# Patient Record
Sex: MALE | Race: WHITE | Employment: OTHER | URBAN - METROPOLITAN AREA
[De-identification: names, ages, dates, MRNs, and addresses within clinical notes are randomized per-mention and may not be internally consistent; named-entity substitution may affect disease eponyms.]

---

## 2018-05-09 PROCEDURE — 81001 URINALYSIS AUTO W/SCOPE: CPT | Performed by: INTERNAL MEDICINE

## 2020-02-18 PROCEDURE — 88305 TISSUE EXAM BY PATHOLOGIST: CPT | Performed by: SPECIALIST

## 2020-10-29 PROBLEM — M75.122 NONTRAUMATIC COMPLETE TEAR OF LEFT ROTATOR CUFF: Status: ACTIVE | Noted: 2020-10-29

## 2021-01-15 PROBLEM — Z47.89 ORTHOPEDIC AFTERCARE: Status: ACTIVE | Noted: 2021-01-15

## 2022-01-25 PROBLEM — Z47.89 ORTHOPEDIC AFTERCARE: Status: RESOLVED | Noted: 2021-01-15 | Resolved: 2022-01-25

## 2022-01-25 PROBLEM — M25.50 ARTHRALGIA, UNSPECIFIED JOINT: Status: ACTIVE | Noted: 2022-01-25

## 2022-01-25 PROBLEM — M75.122 NONTRAUMATIC COMPLETE TEAR OF LEFT ROTATOR CUFF: Status: RESOLVED | Noted: 2020-10-29 | Resolved: 2022-01-25

## 2025-06-18 RX ORDER — CEPHALEXIN 500 MG/1
500 CAPSULE ORAL 4 TIMES DAILY
COMMUNITY
Start: 2025-06-17 | End: 2025-06-21

## 2025-06-19 ENCOUNTER — HOSPITAL ENCOUNTER (OUTPATIENT)
Facility: HOSPITAL | Age: 75
Discharge: HOME | End: 2025-06-21
Attending: ORTHOPAEDIC SURGERY | Admitting: ORTHOPAEDIC SURGERY
Payer: MEDICARE

## 2025-06-19 ENCOUNTER — ANESTHESIA (OUTPATIENT)
Dept: SURGERY | Facility: HOSPITAL | Age: 75
End: 2025-06-19
Payer: MEDICARE

## 2025-06-19 ENCOUNTER — ANESTHESIA EVENT (OUTPATIENT)
Dept: SURGERY | Facility: HOSPITAL | Age: 75
End: 2025-06-19
Payer: MEDICARE

## 2025-06-19 PROBLEM — M65.141 SUPPURATIVE TENOSYNOVITIS OF FLEXOR TENDON OF RIGHT HAND: Status: ACTIVE | Noted: 2025-06-19

## 2025-06-19 LAB
ALBUMIN SERPL-MCNC: 4.6 G/DL (ref 3.2–4.8)
ALBUMIN/GLOB SERPL: 1.6 {RATIO} (ref 1–2)
ALP LIVER SERPL-CCNC: 74 U/L (ref 45–117)
ALT SERPL-CCNC: 30 U/L (ref 10–49)
ANION GAP SERPL CALC-SCNC: 11 MMOL/L (ref 0–18)
AST SERPL-CCNC: 30 U/L (ref ?–34)
BILIRUB SERPL-MCNC: 0.4 MG/DL (ref 0.2–1.1)
BUN BLD-MCNC: 14 MG/DL (ref 9–23)
CALCIUM BLD-MCNC: 9.6 MG/DL (ref 8.7–10.6)
CHLORIDE SERPL-SCNC: 102 MMOL/L (ref 98–112)
CO2 SERPL-SCNC: 24 MMOL/L (ref 21–32)
CREAT BLD-MCNC: 1.09 MG/DL (ref 0.7–1.3)
EGFRCR SERPLBLD CKD-EPI 2021: 72 ML/MIN/1.73M2 (ref 60–?)
GLOBULIN PLAS-MCNC: 2.9 G/DL (ref 2–3.5)
GLUCOSE BLD-MCNC: 261 MG/DL (ref 70–99)
OSMOLALITY SERPL CALC.SUM OF ELEC: 294 MOSM/KG (ref 275–295)
POTASSIUM SERPL-SCNC: 3.8 MMOL/L (ref 3.5–5.1)
PROT SERPL-MCNC: 7.5 G/DL (ref 5.7–8.2)
SODIUM SERPL-SCNC: 137 MMOL/L (ref 136–145)

## 2025-06-19 PROCEDURE — 80053 COMPREHEN METABOLIC PANEL: CPT | Performed by: INTERNAL MEDICINE

## 2025-06-19 PROCEDURE — 87077 CULTURE AEROBIC IDENTIFY: CPT | Performed by: ORTHOPAEDIC SURGERY

## 2025-06-19 PROCEDURE — 87070 CULTURE OTHR SPECIMN AEROBIC: CPT | Performed by: ORTHOPAEDIC SURGERY

## 2025-06-19 PROCEDURE — 87186 SC STD MICRODIL/AGAR DIL: CPT | Performed by: ORTHOPAEDIC SURGERY

## 2025-06-19 PROCEDURE — 87205 SMEAR GRAM STAIN: CPT | Performed by: ORTHOPAEDIC SURGERY

## 2025-06-19 RX ORDER — HYDROCODONE BITARTRATE AND ACETAMINOPHEN 5; 325 MG/1; MG/1
1 TABLET ORAL ONCE AS NEEDED
Status: DISCONTINUED | OUTPATIENT
Start: 2025-06-19 | End: 2025-06-19 | Stop reason: HOSPADM

## 2025-06-19 RX ORDER — HYDROCODONE BITARTRATE AND ACETAMINOPHEN 5; 325 MG/1; MG/1
2 TABLET ORAL ONCE AS NEEDED
Status: DISCONTINUED | OUTPATIENT
Start: 2025-06-19 | End: 2025-06-19 | Stop reason: HOSPADM

## 2025-06-19 RX ORDER — ONDANSETRON 2 MG/ML
4 INJECTION INTRAMUSCULAR; INTRAVENOUS EVERY 6 HOURS PRN
Status: DISCONTINUED | OUTPATIENT
Start: 2025-06-19 | End: 2025-06-19 | Stop reason: HOSPADM

## 2025-06-19 RX ORDER — HYDROMORPHONE HYDROCHLORIDE 1 MG/ML
0.6 INJECTION, SOLUTION INTRAMUSCULAR; INTRAVENOUS; SUBCUTANEOUS EVERY 5 MIN PRN
Status: DISCONTINUED | OUTPATIENT
Start: 2025-06-19 | End: 2025-06-19 | Stop reason: HOSPADM

## 2025-06-19 RX ORDER — SODIUM PHOSPHATE, DIBASIC AND SODIUM PHOSPHATE, MONOBASIC 7; 19 G/230ML; G/230ML
1 ENEMA RECTAL ONCE AS NEEDED
Status: DISCONTINUED | OUTPATIENT
Start: 2025-06-19 | End: 2025-06-21

## 2025-06-19 RX ORDER — ONDANSETRON 2 MG/ML
INJECTION INTRAMUSCULAR; INTRAVENOUS AS NEEDED
Status: DISCONTINUED | OUTPATIENT
Start: 2025-06-19 | End: 2025-06-19 | Stop reason: SURG

## 2025-06-19 RX ORDER — SODIUM CHLORIDE, SODIUM LACTATE, POTASSIUM CHLORIDE, CALCIUM CHLORIDE 600; 310; 30; 20 MG/100ML; MG/100ML; MG/100ML; MG/100ML
INJECTION, SOLUTION INTRAVENOUS CONTINUOUS
Status: DISCONTINUED | OUTPATIENT
Start: 2025-06-19 | End: 2025-06-19 | Stop reason: HOSPADM

## 2025-06-19 RX ORDER — HYDROMORPHONE HYDROCHLORIDE 1 MG/ML
0.2 INJECTION, SOLUTION INTRAMUSCULAR; INTRAVENOUS; SUBCUTANEOUS EVERY 5 MIN PRN
Status: DISCONTINUED | OUTPATIENT
Start: 2025-06-19 | End: 2025-06-19 | Stop reason: HOSPADM

## 2025-06-19 RX ORDER — VANCOMYCIN HYDROCHLORIDE
15
Status: DISCONTINUED | OUTPATIENT
Start: 2025-06-19 | End: 2025-06-21

## 2025-06-19 RX ORDER — MORPHINE SULFATE 4 MG/ML
4 INJECTION, SOLUTION INTRAMUSCULAR; INTRAVENOUS EVERY 2 HOUR PRN
Status: DISCONTINUED | OUTPATIENT
Start: 2025-06-19 | End: 2025-06-21

## 2025-06-19 RX ORDER — SODIUM CHLORIDE, SODIUM LACTATE, POTASSIUM CHLORIDE, CALCIUM CHLORIDE 600; 310; 30; 20 MG/100ML; MG/100ML; MG/100ML; MG/100ML
INJECTION, SOLUTION INTRAVENOUS CONTINUOUS
Status: DISCONTINUED | OUTPATIENT
Start: 2025-06-19 | End: 2025-06-21

## 2025-06-19 RX ORDER — METOCLOPRAMIDE HYDROCHLORIDE 5 MG/ML
10 INJECTION INTRAMUSCULAR; INTRAVENOUS EVERY 8 HOURS PRN
Status: DISCONTINUED | OUTPATIENT
Start: 2025-06-19 | End: 2025-06-21

## 2025-06-19 RX ORDER — BISACODYL 10 MG
10 SUPPOSITORY, RECTAL RECTAL
Status: DISCONTINUED | OUTPATIENT
Start: 2025-06-19 | End: 2025-06-21

## 2025-06-19 RX ORDER — METOCLOPRAMIDE HYDROCHLORIDE 5 MG/ML
10 INJECTION INTRAMUSCULAR; INTRAVENOUS EVERY 8 HOURS PRN
Status: DISCONTINUED | OUTPATIENT
Start: 2025-06-19 | End: 2025-06-19 | Stop reason: HOSPADM

## 2025-06-19 RX ORDER — NALOXONE HYDROCHLORIDE 0.4 MG/ML
0.08 INJECTION, SOLUTION INTRAMUSCULAR; INTRAVENOUS; SUBCUTANEOUS AS NEEDED
Status: DISCONTINUED | OUTPATIENT
Start: 2025-06-19 | End: 2025-06-19 | Stop reason: HOSPADM

## 2025-06-19 RX ORDER — HYDROCODONE BITARTRATE AND ACETAMINOPHEN 5; 325 MG/1; MG/1
1-2 TABLET ORAL EVERY 6 HOURS PRN
Status: DISCONTINUED | OUTPATIENT
Start: 2025-06-19 | End: 2025-06-19 | Stop reason: SDUPTHER

## 2025-06-19 RX ORDER — MORPHINE SULFATE 2 MG/ML
2 INJECTION, SOLUTION INTRAMUSCULAR; INTRAVENOUS EVERY 2 HOUR PRN
Status: DISCONTINUED | OUTPATIENT
Start: 2025-06-19 | End: 2025-06-21

## 2025-06-19 RX ORDER — HYDROMORPHONE HYDROCHLORIDE 1 MG/ML
0.4 INJECTION, SOLUTION INTRAMUSCULAR; INTRAVENOUS; SUBCUTANEOUS EVERY 5 MIN PRN
Status: DISCONTINUED | OUTPATIENT
Start: 2025-06-19 | End: 2025-06-19 | Stop reason: HOSPADM

## 2025-06-19 RX ORDER — DEXAMETHASONE SODIUM PHOSPHATE 4 MG/ML
VIAL (ML) INJECTION AS NEEDED
Status: DISCONTINUED | OUTPATIENT
Start: 2025-06-19 | End: 2025-06-19 | Stop reason: SURG

## 2025-06-19 RX ORDER — HYDROCODONE BITARTRATE AND ACETAMINOPHEN 5; 325 MG/1; MG/1
1 TABLET ORAL EVERY 6 HOURS PRN
Refills: 0 | Status: DISCONTINUED | OUTPATIENT
Start: 2025-06-19 | End: 2025-06-21

## 2025-06-19 RX ORDER — POLYETHYLENE GLYCOL 3350 17 G/17G
17 POWDER, FOR SOLUTION ORAL DAILY PRN
Status: DISCONTINUED | OUTPATIENT
Start: 2025-06-19 | End: 2025-06-21

## 2025-06-19 RX ORDER — DOCUSATE SODIUM 100 MG/1
100 CAPSULE, LIQUID FILLED ORAL 2 TIMES DAILY
Status: DISCONTINUED | OUTPATIENT
Start: 2025-06-19 | End: 2025-06-21

## 2025-06-19 RX ORDER — MORPHINE SULFATE 2 MG/ML
1 INJECTION, SOLUTION INTRAMUSCULAR; INTRAVENOUS EVERY 2 HOUR PRN
Status: DISCONTINUED | OUTPATIENT
Start: 2025-06-19 | End: 2025-06-21

## 2025-06-19 RX ORDER — SENNOSIDES 8.6 MG
17.2 TABLET ORAL NIGHTLY
Status: DISCONTINUED | OUTPATIENT
Start: 2025-06-19 | End: 2025-06-21

## 2025-06-19 RX ORDER — ACETAMINOPHEN 500 MG
1000 TABLET ORAL ONCE
Status: DISCONTINUED | OUTPATIENT
Start: 2025-06-19 | End: 2025-06-19 | Stop reason: HOSPADM

## 2025-06-19 RX ORDER — HYDROCODONE BITARTRATE AND ACETAMINOPHEN 5; 325 MG/1; MG/1
2 TABLET ORAL EVERY 6 HOURS PRN
Refills: 0 | Status: DISCONTINUED | OUTPATIENT
Start: 2025-06-19 | End: 2025-06-21

## 2025-06-19 RX ORDER — ONDANSETRON 2 MG/ML
4 INJECTION INTRAMUSCULAR; INTRAVENOUS EVERY 6 HOURS PRN
Status: DISCONTINUED | OUTPATIENT
Start: 2025-06-19 | End: 2025-06-21

## 2025-06-19 RX ORDER — ACETAMINOPHEN 500 MG
1000 TABLET ORAL ONCE AS NEEDED
Status: DISCONTINUED | OUTPATIENT
Start: 2025-06-19 | End: 2025-06-19 | Stop reason: HOSPADM

## 2025-06-19 RX ORDER — MIDAZOLAM HYDROCHLORIDE 1 MG/ML
INJECTION INTRAMUSCULAR; INTRAVENOUS AS NEEDED
Status: DISCONTINUED | OUTPATIENT
Start: 2025-06-19 | End: 2025-06-19 | Stop reason: SURG

## 2025-06-19 RX ADMIN — ONDANSETRON 4 MG: 2 INJECTION INTRAMUSCULAR; INTRAVENOUS at 15:18:00

## 2025-06-19 RX ADMIN — MIDAZOLAM HYDROCHLORIDE 1 MG: 1 INJECTION INTRAMUSCULAR; INTRAVENOUS at 14:59:00

## 2025-06-19 RX ADMIN — SODIUM CHLORIDE, SODIUM LACTATE, POTASSIUM CHLORIDE, CALCIUM CHLORIDE: 600; 310; 30; 20 INJECTION, SOLUTION INTRAVENOUS at 15:50:00

## 2025-06-19 RX ADMIN — MIDAZOLAM HYDROCHLORIDE 1 MG: 1 INJECTION INTRAMUSCULAR; INTRAVENOUS at 15:19:00

## 2025-06-19 RX ADMIN — DEXAMETHASONE SODIUM PHOSPHATE 4 MG: 4 MG/ML VIAL (ML) INJECTION at 15:18:00

## 2025-06-19 NOTE — PLAN OF CARE
A&Ox4. VSS. On room air. . Acewrap to right hand C/D/I. IV abx, cx pending. Patient and spouse updated and in agreement with plan of care. Safety precautions in place. Instructed patient to call for assistance, call light within reach.

## 2025-06-19 NOTE — PROGRESS NOTES
[facility] Dosing Service      Initial Pharmacokinetic Consult for Vancomycin Dosing     Shahid Franks is a 75 year old male who is being initiated on vancomycin therapy for cellulitis.  Pharmacy has been asked to dose vancomycin by Dr. Banks.  The initial treatment and monitoring approach will be steady state AUC strategy.        Weight and Temperature:    Wt Readings from Last 1 Encounters:   25 95 kg (209 lb 7.2 oz)        Temp Readings from Last 1 Encounters:   25 97.6 °F (36.4 °C) (Oral)      Labs:   No results for input(s): \"CREATSERUM\" in the last 168 hours.   CrCl cannot be calculated (Patient's most recent lab result is older than the maximum 7 days allowed.).     No results for input(s): \"WBC\" in the last 168 hours.       The Pharmacokinetic Target is:     to 600 mg-h/L and trough <=15 mg/L    Renal Dosing Considerations:    None     Assessment/Plan:   Initial/Loading dose: Will receive 1500 mg IV (15 mg/kg, capped at 2250 mg) x 1 initial dose.      Maintenance dose: Pharmacy will dose vancomycin at 1500 mg IV every 18 hours    Monitorin) Plan for vancomycin peak and trough to be obtained at steady state    2) Pharmacy will order SCr as clinically indicated to assess renal function.    3) Pharmacy will monitor for toxicity and efficacy, adjust vancomycin dose and/or frequency, and order vancomycin levels as appropriate per the Pharmacy and Therapeutics Committee approved protocol until discontinuation of the medication.       We appreciate the opportunity to assist in the care of this patient.     Bruna Carbajal, Betzy  2025  5:21 PM  [facility] Extension: [phone]

## 2025-06-19 NOTE — H&P
Right Middle Finger - Pain Trigger finger   Left Ring Finger - Pain Trigger finger   Reason for Visit History      Progress Notes  Luzmaria Loving PA-C (Physician Assistant)  Physician Assistant  Expand All Collapse All  6/18/2025  Shahid Franks  2/13/1950  75 year old   male  Luzmaria Loving PA-C     HPI:      Mr Franks is here for a post operative visit having had surgery on 6- with Dr Gonzales.     He had right middle finger trigger release that appears infected & need surgery     He also had left ring finger trigger release        PMH:              Past Medical History:   Diagnosis Date    Hyperlipidemia      OSTEOARTHRITIS      OTHER DISEASES 2010     carotid dissection            Past Surgical History:   Procedure Laterality Date    COLONOSCOPY   10/01/2008    COLONOSCOPY N/A 02/18/2020     Procedure: COLONOSCOPY, POSSIBLE BIOPSY, POSSIBLE POLYPECTOMY 94358;  Surgeon: Yumiko Grimaldo MD;  Location: [facility], [facility]    OTHER SURGICAL HISTORY   2012     right rotator cuff repair  left 2021    SHOULDER ARTHROSCOPY Right 02/07/2025     acromioplasty, rotator cuff repair   Right rotator cuff February 7th     Medication:   Medications Ordered Prior to Encounter   cephalexin 500 MG Oral Cap, Take 1 capsule (500 mg total) by mouth 4 (four) times daily., Disp: 40 capsule, Rfl: 0  cephalexin 500 MG Oral Cap, Take 1 capsule PO BID for 7 days, Disp: 14 capsule, Rfl: 0  Ciclopirox 8 % External Solution, Apply 1 Application. topically nightly., Disp: 6 mL, Rfl: 11  PANTOPRAZOLE 40 MG Oral Tab EC, TAKE 1 TABLET BY MOUTH DAILY BEFORE MEAL, Disp: 90 tablet, Rfl: 0  Naproxen Sodium (ALEVE OR), Take by mouth as needed., Disp: , Rfl:   MULTIVITAMINS OR TABS, 1 TABLET DAILY, Disp: , Rfl:   GARLIC, qd, Disp: , Rfl:   GLUCOSAMINE CHONDRO COMPLEX OR, qd, Disp: , Rfl:   VITAMIN E, occassionally, Disp: , Rfl:   VITAMIN-B COMPLEX OR, occassionally, Disp: , Rfl:      No current facility-administered  medications on file prior to visit.      Allergies:  Allergies   No Known Allergies         EXAM:      Shahid Franks is awake and oriented x 3 and overall well appearing.    Shahid Franks is calm        S1 S2 no murmur click or rub  Lungs clear to auscultation     The incision is oozing slightly  Redness and swelling to the tip of the finger     He cannot move the finger much due to pain  Passive extension is painful        ASSESSMENT:      Trigger middle finger of right hand  (primary encounter diagnosis)  Infection in the flexor tendon sheath     PLAN:      1) surgical I& D indicated now        YOHANA Miles,PAJoseC

## 2025-06-19 NOTE — ANESTHESIA PROCEDURE NOTES
Airway  Date/Time: 6/19/2025 3:05 PM  Reason: elective      General Information and Staff   Patient location during procedure: OR  Anesthesiologist: Jemal Palmer MD  Performed: anesthesiologist   Performed by: Jemal Palmer MD  Authorized by: Jemal Palmer MD        Indications and Patient Condition  Indications for airway management: anesthesia  Sedation level: deep      Preoxygenated: yesPatient position: sniffing    Mask difficulty assessment: 0 - not attempted    Final Airway Details    Final airway type: supraglottic airway      Successful airway: classic  Size: 4     Number of attempts at approach: 1

## 2025-06-19 NOTE — CONSULTS
Infectious Disease Initial Consultation      Date of admission: 6/19/2025  1:43 PM     Date of service: 06/19/25 5:03 PM    Consult requested by: Sameer Torres MD    Reason for consult: Right middle finger tenosynovitis    Chief complaint: Right middle finger tenosynovitis    History of present illness: Shahid Franks is a 75 year old male with history of hyperlipidemia, presents here for right middle finger tenosynovitis, underwent or debridement on 6/19/2025.  Otherwise, he was hemodynamically stable, afebrile.  Cultures were sent and are currently pending.  The patient was given IV cefazolin preoperatively.    Risk factors/Exposures:  Living situation: At home with family  Sick contacts: None  Animals: No pets or other animal exposure  Travel: No recent local/international travel  /FCI/detention: None  Outdoor activities: Nothing unusual  Active TB exposure: None  IV drug use: None    Review of systems:  All other components of the review of systems are negative, except those described in the history of present illness.     Past Medical History[1]  Past Surgical History[2]  Short Social Hx on File[3]  Family History[4]  Reviewed, see above    Medications:  Current Hospital Medications[5]     Allergies:  Allergies[6]    Physical Exam:  Vitals:    06/19/25 1639   BP: 135/82   Pulse: 59   Resp: 17   Temp: 97.6 °F (36.4 °C)     Vitals signs and nursing note reviewed.   Constitutional:       Appearance: Normal appearance.   HENT:      Head: Normocephalic and atraumatic.      Mouth/Throat: Normal dentiti   Mouth: Mucous membranes are moist.   Neck:      Musculoskeletal: Neck supple.   Cardiovascular:      Rate and Rhythm: Normal rate.   Pulmonary:      Effort: Pulmonary effort is normal. No respiratory distress.   Skin:     General: Skin is warm and dry.   Neurological:      General: No focal deficit present.      Mental Status: Alert and oriented to person, place, and time.     Laboratory data:  I have  independently reviewed all lab results; including old microbiological results.        No results for input(s): \"RBC\", \"HGB\", \"HCT\", \"MCV\", \"MCH\", \"MCHC\", \"RDW\", \"NEPRELIM\", \"WBC\", \"PLT\", \"NEUT\", \"LYMPH\", \"MON\", \"EOS\", \"NRBC\" in the last 168 hours.    Microbiology data:  No results found for this visit on 06/19/25.    Impression:  Shahid Franks is a 75 year old male with     Right middle finger suppurative tenosynovitis  After recent trigger finger surgery  Status post OR debridement on 6/19/2025  Surgical cultures are pending  Currently on IV cefazolin    Recommendations:     Discontinue cefazolin and start ceftriaxone 2 g IV daily along with vancomycin, pharmacy to dose  Continue to follow-up on surgical cultures  Continue to monitor daily labs for antibiotic toxicity  Further recommendations will depend on the above work-up and clinical progress     The plan of care was discussed with the primary hospital team, Sameer Torres MD     Recommendations were also discussed with the patient; all questions were answered.     Thank you for this consultation. Please don't hesitate to call the ID team for questions or any acute changes in patient's clinical condition.    Please note that this report has been produced using speech recognition software and may contain errors related to that system including, but not limited to, errors in grammar, punctuation, and spelling, as well as words and phrases that possibly may have been recognized inappropriately.  If there are any questions or concerns, contact the dictating provider for clarification.    The 21st Century Cures Act makes medical notes like these available to patients in the interest of transparency. Please be advised this is a medical document. Medical documents are intended to carry relevant information, facts as evident, and the clinical opinion of the practitioner. The medical note is intended as peer to peer communication and may appear blunt or direct. It  is written in medical language and may contain abbreviations or verbiage that are unfamiliar.     Shawn Banks MD  [facility]. Tel: [phone]. Fax: [phone] Franks : 1950 MRN: [id number] CSN: [id number]          [1]   Past Medical History:   Hyperlipidemia    OSTEOARTHRITIS    OTHER DISEASES    carotid dissection    Visual impairment    glasses   [2]   Past Surgical History:  Procedure Laterality Date    Colonoscopy  10/08    Colonoscopy N/A 2020    Procedure: COLONOSCOPY, POSSIBLE BIOPSY, POSSIBLE POLYPECTOMY 20411;  Surgeon: Yumiko Grimaldo MD;  [facility] [facility]    Other surgical history      right rotator cuff repair   [3]   Social History  Socioeconomic History    Marital status:    Tobacco Use    Smoking status: Never    Smokeless tobacco: Never   Vaping Use    Vaping status: Never Used   Substance and Sexual Activity    Alcohol use: Yes     Alcohol/week: 12.0 standard drinks of alcohol     Types: 12 Standard drinks or equivalent per week     Comment: WINE    Drug use: No   Other Topics Concern    Seat Belt Yes     Social Drivers of Health     Food Insecurity: No Food Insecurity (2025)    NCSS - Food Insecurity     Worried About Running Out of Food in the Last Year: No     Ran Out of Food in the Last Year: No   Transportation Needs: No Transportation Needs (2025)    NCSS - Transportation     Lack of Transportation: No   Housing Stability: Not At Risk (2025)    NCSS - Housing/Utilities     Has Housing: Yes     Worried About Losing Housing: No     Unable to Get Utilities: No   [4]   Family History  Problem Relation Age of Onset    Cancer Father         PROSTATE    Other (Other) Father         dementia    Other (Other) Mother         CVA   [5]   lactated ringers    sodium chloride    sennosides    docusate sodium    polyethylene glycol (PEG 3350)    magnesium hydroxide    bisacodyl    fleet enema    ondansetron     metoclopramide    morphINE **OR** morphINE **OR** morphINE    ceFAZolin    HYDROcodone-acetaminophen **OR** HYDROcodone-acetaminophen  [6]   Allergies  Allergen Reactions    Statins MYALGIA

## 2025-06-19 NOTE — OPERATIVE REPORT
[facility]    PATIENT'S NAME: KYRA JIMENEZ   ATTENDING PHYSICIAN: Sameer Torres M.D.   OPERATING PHYSICIAN: Sameer Torres M.D.   PATIENT ACCOUNT#:   [id number]    LOCATION:  [address] [place] [address]  MEDICAL RECORD #:   [id number]       YOB: 1950  ADMISSION DATE:       06/19/2025      OPERATION DATE:  06/19/2025    OPERATIVE REPORT      PREOPERATIVE DIAGNOSIS:  Flexor tenosynovitis, right middle finger.  POSTOPERATIVE DIAGNOSIS:  Flexor tenosynovitis, right middle finger.  PROCEDURE:  Incision and drainage, flexor tendon sheath, right middle finger.    ANESTHESIA:  General.    OPERATIVE TECHNIQUE:  After induction of anesthesia, the arm was prepped and draped in sterile fashion.  The forearm and upper arm were exsanguinated and tourniquet elevated to 250 mmHg.  I first opened up the incision at the distal palmar crease from his previous trigger finger injection, and there were copious amounts of turbid fluid.  Cultures were sent for aerobic and anaerobic cultures.  Incision was made at the distal crease at the DIP joint, and a blunt needle was used to irrigate distal to proximal and then proximal to distal until fluid was returning clear.  The tourniquet was let down, and dressings were applied.  The patient was taken to the postanesthesia recovery room in stable condition.  No complications.  Specimens:  Aerobic and anaerobic cultures.    Dictated By Sameer Torres M.D.  d: 06/19/2025 16:07:13  t: 06/19/2025 17:01:37  [facility] [id number]  RLW/

## 2025-06-19 NOTE — BRIEF OP NOTE
Pre-Operative Diagnosis: TRIGGER MIDDLE FINGER HAND; INFECTION OF TENDON SHEATH     Post-Operative Diagnosis: TRIGGER MIDDLE FINGER HAND; INFECTION OF TENDON SHEATH      Procedure Performed:   RIGHT MIDDLE FINGER INCISION AND DRAINAGE    Surgeons and Role:     * Sameer Torres MD - Primary    Assistant(s):        Surgical Findings:        Specimen: CX for aerobic and anaerobic     Estimated Blood Loss: Blood Output: 5 mL (6/19/2025  3:35 PM)      Dictation Number:       Sameer Torres MD  6/19/2025  3:47 PM

## 2025-06-19 NOTE — CONSULTS
[facility] Hospitalist Consult Note     PCP: Huber Montes MD    History of Present Illness: Patient is a 75 year old male presented with right middle finger flexor tenosynovitis and underwent incision and drainage on 6/19. Seen postoperatively. Pain controlled. No n/v. Family at bedside.     Medical History:  Past Medical History[1]   Past Surgical History[2]   Social History     Tobacco Use    Smoking status: Never    Smokeless tobacco: Never   Substance Use Topics    Alcohol use: Yes     Alcohol/week: 12.0 standard drinks of alcohol     Types: 12 Standard drinks or equivalent per week     Comment: WINE      Family History[3]    Allergies[4]     Review of Systems  Comprehensive ROS reviewed and negative except for what is stated in HPI.      OBJECTIVE:  /82 (BP Location: Left arm)   Pulse 59   Temp 97.6 °F (36.4 °C) (Oral)   Resp 17   Ht 6' 2\" (1.88 m)   Wt 209 lb 7.2 oz (95 kg)   SpO2 95%   BMI 26.89 kg/m²   General: No apparent distress.  Alert and oriented.  HEENT:  EOMI, PERRLA.  Pulm: Clear breath sounds bilaterally.  Normal respiratory effort.  CV: Regular rate and rhythm, no murmur.   Abd: Soft, nontender, nondistended.   MSK: Right hand wrapped, no apparent drainage.   Skin: No lesions or rashes.  Neuro: No obvious focal deficits.    Data Review:    LABS:        All lab work and imaging personally reviewed.    Assessment/Plan:     Assessment/ Plan:     #Right middle finger tenosynovitis s/p incision/ drainage  -doing well, continue current pain regimen  -received periop ancef,  continue vancomycin/  rocephin. ID following.   -surgical cx pending  -monitor for s/sx of infection    DVT ppx: SCDs  Diet: advance as tolerated  Disposition: depending clinical course    D/w patient and family at bedside.     Outpatient records or previous hospital records reviewed.   [facility] hospitalist to continue to follow patient while in house.    DO Aylin Espino [facility] Hospitalist       [1]    Past Medical History:   Hyperlipidemia    OSTEOARTHRITIS    OTHER DISEASES    carotid dissection    Visual impairment    glasses   [2]   Past Surgical History:  Procedure Laterality Date    Colonoscopy  10/08    Colonoscopy N/A 2/18/2020    Procedure: COLONOSCOPY, POSSIBLE BIOPSY, POSSIBLE POLYPECTOMY 91705;  Surgeon: Yumiko Grimaldo MD;  [place] [facility]    Other surgical history      right rotator cuff repair   [3]   Family History  Problem Relation Age of Onset    Cancer Father         PROSTATE    Other (Other) Father         dementia    Other (Other) Mother         CVA   [4]   Allergies  Allergen Reactions    Statins MYALGIA

## 2025-06-19 NOTE — ANESTHESIA PREPROCEDURE EVALUATION
PRE-OP EVALUATION      Patient Name: Shahid Franks    Pre-op Diagnosis:  Procedure(s):  Finger ID      Surgeon(s) and Role:     Reji Torres  Pre-op vitals reviewed.  Patient Data/Vitals  Height: 188 cm (6' 2\")  Weight: 95 kg (209 lb 7.2 oz)  Weight (kg): 95.01 kg  BMI: 26.89  BP: (!) 167/92  Pulse: 57  Resp: 16  Temp: 98 °F (36.7 °C)    Current medications reviewed.  Hospital Medications:   [Transfer Hold] acetaminophen (Tylenol Extra Strength) tab 1,000 mg  1,000 mg Oral Once    lactated ringers infusion   Intravenous Continuous    ceFAZolin (Ancef) 2g in 10mL IV syringe premix  2 g Intravenous Once           Outpatient Medications:  No current outpatient medications on file.      Allergies: Statins        Anesthesia Evaluation    Patient summary reviewed.    Anesthetic Complications  (-) history of anesthetic complications         GI/Hepatic/Renal    Negative GI/hepatic/renal ROS.                             Cardiovascular        Exercise tolerance: good     MET: >4         (+) hyperlipidemia                    (-) angina     (-) MUNGUIA         Endo/Other                           (+) arthritis       Pulmonary    Negative pulmonary ROS.                       Neuro/Psych    Negative neuro/psych ROS.                                  Past Surgical History:   Procedure Laterality Date    Colonoscopy  10/08    Colonoscopy N/A 2/18/2020    Procedure: COLONOSCOPY, POSSIBLE BIOPSY, POSSIBLE POLYPECTOMY 08111;  Surgeon: Yumiko Grimaldo MD;  Location: [facility] [place] [facility]    Other surgical history      right rotator cuff repair     Social History     Tobacco Use    Smoking status: Never    Smokeless tobacco: Never   Substance Use Topics    Alcohol use: Yes     Alcohol/week: 12.0 standard drinks of alcohol     Types: 12 Standard drinks or equivalent per week     Comment: WINE     History   Drug Use No       Available pre-op labs reviewed.                                                        Airway      Mallampati: II  Mouth  opening: >3 FB  TM distance: 4 - 6 cm  Neck ROM: full Cardiovascular    Cardiovascular exam normal.         Dental             Pulmonary    Pulmonary exam normal.                 Other findings              ASA: 2   Plan: general  NPO status verified and patient meets guidelines.        Comment: General requested per surgeon  Plan/risks discussed with: patient                  ASA: 2   Plan: general  NPO status verified and patient meets guidelines.        Comment: General requested per surgeon  Plan/risks discussed with: patient

## 2025-06-19 NOTE — PROGRESS NOTES
[facility] Pharmacy Dosing Service      Initial Pharmacokinetic Consult for Vancomycin Dosing     Shahid Franks is a 75 year old male who is being initiated on vancomycin therapy for cellulitis.  Pharmacy has been asked to dose vancomycin by Dr. Banks.  The initial treatment and monitoring approach will be steady state AUC strategy.        Weight and Temperature:    Wt Readings from Last 1 Encounters:   25 95 kg (209 lb 7.2 oz)        Temp Readings from Last 1 Encounters:   25 97.6 °F (36.4 °C) (Oral)      Labs:   No results for input(s): \"CREATSERUM\" in the last 168 hours.   CrCl cannot be calculated (Patient's most recent lab result is older than the maximum 7 days allowed.).     No results for input(s): \"WBC\" in the last 168 hours.       The Pharmacokinetic Target is:     to 600 mg-h/L and trough <=15 mg/L    Renal Dosing Considerations:    None     Assessment/Plan:   Initial/Loading dose: Will receive 1500 mg IV (15 mg/kg, capped at 2250 mg) x 1 initial dose.      Maintenance dose: Pharmacy will dose vancomycin at 1500 mg IV every 18 hours    Monitorin) Plan for vancomycin peak and trough to be obtained at steady state    2) Pharmacy will order SCr as clinically indicated to assess renal function.    3) Pharmacy will monitor for toxicity and efficacy, adjust vancomycin dose and/or frequency, and order vancomycin levels as appropriate per the Pharmacy and Therapeutics Committee approved protocol until discontinuation of the medication.       We appreciate the opportunity to assist in the care of this patient.     Bruna Carbajal, Betzy  2025  5:21 PM  [facility] Extension: [phone]

## 2025-06-19 NOTE — ANESTHESIA POSTPROCEDURE EVALUATION
[facility]    Shahid Franks Patient Status:  Outpatient in a Bed   Age/Gender 75 year old male MRN [id number]   Location [facility] POST ANESTHESIA CARE UNIT Attending Sameer Torres MD   Hosp Day # 0 PCP Huber Montes MD       Anesthesia Post-op Note    RIGHT MIDDLE FINGER INCISION AND DRAINAGE    Procedure Summary       Date: 06/19/25 Room / Location:  MAIN OR 12 / [facility] MAIN OR    Anesthesia Start: 1459 Anesthesia Stop: 1550    Procedure: RIGHT MIDDLE FINGER INCISION AND DRAINAGE (Right: Finger) Diagnosis: (TRIGGER MIDDLE FINGER HAND; INFECTION OF TENDON SHEATH)    Surgeons: Sameer Torres MD Anesthesiologist: Jemal Palmer MD    Anesthesia Type: general ASA Status: 2            Anesthesia Type: general    Vitals Value Taken Time    90 06/19/25 15:50   Temp 97 06/19/25 15:50   Pulse 68 06/19/25 15:50   Resp 18 06/19/25 15:50   SpO2 96 06/19/25 15:50           Patient Location: PACU    Anesthesia Type: general    Airway Patency: patent    Postop Pain Control: adequate    Mental Status: preanesthetic baseline    Nausea/Vomiting: none    Cardiopulmonary/Hydration status: stable euvolemic    Complications: no apparent anesthesia related complications    Postop vital signs: stable    Dental Exam: Unchanged from Preop    Patient to be discharged from PACU when criteria met.

## 2025-06-19 NOTE — INTERVAL H&P NOTE
Pre-op Diagnosis: TRIGGER MIDDLE FINGER HAND; INFECTION OF TENDON SHEATH    The above referenced H&P was reviewed by Sameer Torres MD on 6/19/2025, the patient was examined and no significant changes have occurred in the patient's condition since the H&P was performed.  I discussed with the patient and/or legal representative the potential benefits, risks and side effects of this procedure; the likelihood of the patient achieving goals; and potential problems that might occur during recuperation.  I discussed reasonable alternatives to the procedure, including risks, benefits and side effects related to the alternatives and risks related to not receiving this procedure.  We will proceed with procedure as planned.

## 2025-06-20 LAB
CREAT BLD-MCNC: 1.05 MG/DL (ref 0.7–1.3)
EGFRCR SERPLBLD CKD-EPI 2021: 75 ML/MIN/1.73M2 (ref 60–?)
HCT VFR BLD AUTO: 39.5 % (ref 39–53)
HGB BLD-MCNC: 14.7 G/DL (ref 13–17.5)

## 2025-06-20 PROCEDURE — 82565 ASSAY OF CREATININE: CPT | Performed by: INTERNAL MEDICINE

## 2025-06-20 PROCEDURE — 85018 HEMOGLOBIN: CPT | Performed by: ORTHOPAEDIC SURGERY

## 2025-06-20 PROCEDURE — 85014 HEMATOCRIT: CPT | Performed by: ORTHOPAEDIC SURGERY

## 2025-06-20 NOTE — PROGRESS NOTES
[facility]   part of [facility] Infectious Disease  Progress Note    Shahid Franks Patient Status:  Outpatient in a Bed    1950 MRN [id number]   Location [facility] 3SW-A Attending Sameer Torres MD   Hosp Day # 0 PCP Huber Montes MD     Subjective:  Patient seen and examined sitting up in bed. Feeling well today. No acute overnight events. Tolerating IV antibiotics. Pain controlled. Denies F/C. Denies n/v/d. Otherwise no new complaints.     Objective:  Blood pressure 154/62, pulse 66, temperature 98.2 °F (36.8 °C), temperature source Oral, resp. rate 18, height 6' 2\" (1.88 m), weight 209 lb 7.2 oz (95 kg), SpO2 95%.    Intake/Output:    Intake/Output Summary (Last 24 hours) at 2025 0712  Last data filed at 2025 1550  Gross per 24 hour   Intake 500 ml   Output 5 ml   Net 495 ml       Physical Exam:  General: Awake, alert, non-tox, NAD.  HEENT:  Oropharynx clear, trachea ML.  Heart: RRR S1S2 no murmurs.  Lungs: Essentially CTA b/l, no rhonchi, rales, wheezes.  Abdomen: Soft, NT/ND.  BS present.  No guarding or rebound.  Extremity: R hand wrapped with dressing c/d/i.  Neurological: No focal deficits.  Derm:  Warm and dry.    Lab Data Review:  Lab Results   Component Value Date    HGB 14.7 2025    HCT 39.5 2025    CREATSERUM 1.05 2025    BUN 14 2025     2025    K 3.8 2025     2025    CO2 24.0 2025     2025    CA 9.6 2025    ALB 4.6 2025    ALKPHO 74 2025    BILT 0.4 2025    TP 7.5 2025    AST 30 2025    ALT 30 2025        Cultures:  No results found for this visit on 25.    Radiology:  No results found.    Assessment and Plan:  1. R middle finger suppurative tenosynovitis  - After recent trigger finger surgery.  - S/p OR debridement on 25 -- cultures pending.  - IV Rocephin + vancomycin, ongoing.    2. Recs  - Continue IV Rocephin +  vancomycin, pharmacy to dose.  - F/u WBC and fever curve.  - F/u cultures.  - Supportive care as per the primary team.  - Discussed plan of care with nursing.  - Discussed plan of care with patient. All questions addressed and understanding verbalized.  - Further recommendations pending clinical course.     Discussed case with ID attending/collaborating physician, Dr. Shawn Banks, who is in agreement with the above plan of care    Please note that this report has been produced using speech recognition software and may contain errors related to that system including, but not limited to, errors in grammar, punctuation, and spelling, as well as words and phrases that possibly may have been recognized inappropriately.  If there are any questions or concerns, contact the dictating provider for clarification.     The 21st Century Cures Act makes medical notes like these available to patients in the interest of transparency. Please be advised this is a medical document. Medical documents are intended to carry relevant information, facts as evident, and the clinical opinion of the practitioner. The medical note is intended as peer to peer communication and may appear blunt or direct. It is written in medical language and may contain abbreviations or verbiage that are unfamiliar.     If you have any questions or concerns please call [facility] Infectious Disease at [phone].     KENIA Gray    6/20/2025  7:12 AM

## 2025-06-20 NOTE — PROGRESS NOTES
Postop day #1    Shahid states he is much more comfortable    /62 (BP Location: Left arm)   Pulse 66   Temp 98.2 °F (36.8 °C) (Oral)   Resp 18   Ht 6' 2\" (1.88 m)   Wt 209 lb 7.2 oz (95 kg)   SpO2 95%   BMI 26.89 kg/m²     On examination swelling and tenderness are much improved.  No active drainage.  No fluctuance.    Cultures still pending    Assessment and plan: Postop day #1 status post incision and drainage for flexor tenosynovitis of the right middle finger.  Symptoms improved.  Awaiting cultures to determine final disposition.

## 2025-06-20 NOTE — PROGRESS NOTES
[facility] Hospitalist Progress Note     Subjective:  No acute events. Denies new symptoms.     Review of Systems  Comprehensive ROS reviewed and negative except for what is stated in HPI.      OBJECTIVE:  BP (!) 156/91 (BP Location: Left arm)   Pulse 60   Temp 97.9 °F (36.6 °C) (Oral)   Resp 18   Ht 6' 2\" (1.88 m)   Wt 209 lb 7.2 oz (95 kg)   SpO2 94%   BMI 26.89 kg/m²   General: No apparent distress.  Alert and oriented.  HEENT:  EOMI, PERRLA.  Pulm: Clear breath sounds bilaterally.  Normal respiratory effort.  CV: Regular rate and rhythm, no murmur.   Abd: Soft, nontender, nondistended.   MSK: Right hand wrapped, no apparent drainage.   Skin: No lesions or rashes.  Neuro: No obvious focal deficits.      Data Review:    LABS:   Lab Results   Component Value Date    HGB 14.7 06/20/2025    HCT 39.5 06/20/2025    CREATSERUM 1.05 06/20/2025    BUN 14 06/19/2025     06/19/2025    K 3.8 06/19/2025     06/19/2025    CO2 24.0 06/19/2025     06/19/2025    CA 9.6 06/19/2025    ALB 4.6 06/19/2025    ALKPHO 74 06/19/2025    BILT 0.4 06/19/2025    TP 7.5 06/19/2025    AST 30 06/19/2025    ALT 30 06/19/2025       All lab work and imaging personally reviewed.    Assessment/Plan:     Assessment/ Plan:     #Right middle finger tenosynovitis s/p incision/ drainage  -continue current pain regimen  -received periop ancef,  continue vancomycin/  rocephin. ID following.   -surgical cx pending- will follow   -monitor for s/sx of infection     DVT ppx: SCDs  Diet: advance as tolerated  Disposition: depending clinical course     D/w patient and family at bedside.      Outpatient records or previous hospital records reviewed.   [facility] hospitalist to continue to follow patient while in house.     DO [facility] Hospitalist

## 2025-06-20 NOTE — PLAN OF CARE
A&Ox4. VSS on RA. /IS. SCDs, ankle pumps encouraged. Tolerating diet, last BM 6/19. Voiding freely. Pain mananged with current medications. Dressing to R hand C/D/I. Up ad krishna. IV abx, cx pending. Patient updated on POC, verbalized agreement. Safety precautions in place, pt instructed to call for assistance, call light within reach.

## 2025-06-20 NOTE — PLAN OF CARE
POD 1 Rt middle finger I&D, Pt is AAOX4, 2 weeks post-op from trigger finger release, Pt is AAOX4, VSS, room air, up Ad krishna, PO meds for pain, see MAR, IV ABX, possible discharge tomorrow on PO ABX, will CTM.

## 2025-06-21 VITALS
SYSTOLIC BLOOD PRESSURE: 161 MMHG | WEIGHT: 209.44 LBS | DIASTOLIC BLOOD PRESSURE: 91 MMHG | RESPIRATION RATE: 18 BRPM | HEIGHT: 74 IN | TEMPERATURE: 98 F | BODY MASS INDEX: 26.88 KG/M2 | HEART RATE: 64 BPM | OXYGEN SATURATION: 91 %

## 2025-06-21 LAB
ERYTHROCYTE [DISTWIDTH] IN BLOOD BY AUTOMATED COUNT: 13 %
HCT VFR BLD AUTO: 41.2 % (ref 39–53)
HGB BLD-MCNC: 14.1 G/DL (ref 13–17.5)
MCH RBC QN AUTO: 32.4 PG (ref 26–34)
MCHC RBC AUTO-ENTMCNC: 34.2 G/DL (ref 31–37)
MCV RBC AUTO: 94.7 FL (ref 80–100)
PLATELET # BLD AUTO: 248 10(3)UL (ref 150–450)
RBC # BLD AUTO: 4.35 X10(6)UL (ref 3.8–5.8)
WBC # BLD AUTO: 7.7 X10(3) UL (ref 4–11)

## 2025-06-21 PROCEDURE — 85027 COMPLETE CBC AUTOMATED: CPT | Performed by: STUDENT IN AN ORGANIZED HEALTH CARE EDUCATION/TRAINING PROGRAM

## 2025-06-21 RX ORDER — CEFADROXIL 500 MG/1
1 CAPSULE ORAL 2 TIMES DAILY
Qty: 84 CAPSULE | Refills: 0 | Status: SHIPPED | OUTPATIENT
Start: 2025-06-21 | End: 2025-07-12

## 2025-06-21 NOTE — PLAN OF CARE
NURSING DISCHARGE NOTE    Discharged Home via Ambulatory.  Accompanied by Spouse  Belongings Taken by patient/family.    AVS printed and discussed, IV removed, Rx e-scribed for ABX, reminded to  medicine at pharmacy. Pt ready to discharge home.

## 2025-06-21 NOTE — PLAN OF CARE
A/o x4. Ra/. Denies pain, n/t to R hand. Declined pain medication. Pt hand wrap with kerlix dressing cdi. CMS intact. IV abx IV SL. Cx pending poss dc 6/21 on po abx or when medically cleared. Sleep aide requested by pt for this evening see orders. Up independent. Voiding without difficulty. All needs met at this time. POC updated with pt and spouse at bedside. All safety measures in place. Call light within reach instructed pt to call for help or assistance.

## 2025-06-21 NOTE — PROGRESS NOTES
S: I feel better- I just took this guaze off.    O: Blood pressure 160/84, pulse 59, temperature 98 °F (36.7 °C), temperature source Oral, resp. rate 16, height 6' 2\" (1.88 m), weight 209 lb 7.2 oz (95 kg), SpO2 93%.  Lab Results   Component Value Date    WBC 7.7 06/21/2025    HGB 14.1 06/21/2025    HCT 41.2 06/21/2025    .0 06/21/2025       Right middle finger less swollen than in the office.  He can move the finger in flexion and extension but lacks motion to full extension or flexion  He will work on motion    Palpation all along the flexor tendon sheath is not painful.  No drainage from either wound    He is growing staph aureus with options for antibiotic sensitivities.      A: POD #2 Right hand I&D flexor tenosynovitis right middle finger    P: D/C Planning pending ID arrangements for antibiotics.    YOHANA Miles,PA-C

## 2025-06-21 NOTE — PROGRESS NOTES
Infectious Disease Progress Note      Date of admission: 6/19/2025  1:43 PM     Reason for consult: Right middle finger tenosynovitis    Referring physician: Sameer Torres MD    Subjective: Feels well.  Pain is improved.  Swelling also involving the right middle finger continues to improve.  No nausea vomiting.  No diarrhea.  Cultures with MSSA.    The rest of the systems were reviewed and found to be negative except was mentioned above    Interval events: This is a 75-year-old male patient with history of hyperlipidemia, presents here with right middle finger tenosynovitis after trigger finger surgery  Now status post washout with orthopedics on 6/19/2025.  Cultures with MSSA.  Currently on IV ceftriaxone and vancomycin.    Medications:  Current Hospital Medications[1]     Allergies:  Allergies[2]    Physical Exam:  Vitals:    06/21/25 0833   BP: (!) 161/91   Pulse: 64   Resp:    Temp:      Vitals signs and nursing note reviewed.   Constitutional:       Appearance: Normal appearance.   HENT:      Mouth: Mucous membranes are moist.   Neck:      Musculoskeletal: Neck supple.   Cardiovascular:      Rate and Rhythm: Normal rate.   Pulmonary:      Effort: Pulmonary effort is normal. No respiratory distress.   Musculoskeletal:      Right hand: Continued improved swelling and tenderness and cellulitis.  Skin:     General: Skin is warm and dry.   Neurological:      General: No focal deficit present.      Mental Status: Alert and oriented to person, place, and time.       Laboratory data:  I have reviewed all the lab results independently.  Lab Results   Component Value Date    WBC 7.7 06/21/2025    HGB 14.1 06/21/2025    HCT 41.2 06/21/2025    .0 06/21/2025      Recent Labs   Lab 06/21/25  0337   RBC 4.35   HGB 14.1   HCT 41.2   MCV 94.7   MCH 32.4   MCHC 34.2   RDW 13.0   WBC 7.7   .0      Microbiology data:  Hospital Encounter on 06/19/25   1. Body Fluid Cult Aerobic and Anaerobic     Status: Abnormal  (Preliminary result)    Collection Time: 06/19/25  3:21 PM    Specimen: Synovial fluid,finger; Body fluid, unspecified   Result Value Ref Range    Body Fluid Culture Result 1+ growth Staphylococcus aureus (A) N/A    Body Fluid Smear No WBCs seen N/A    Body Fluid Smear No organisms seen N/A       Susceptibility    Staphylococcus aureus -  (no method available)     Cefazolin  Sensitive      Clindamycin <=0.25 Sensitive      Erythromycin <=0.25 Sensitive      Gentamicin <=0.5 Sensitive      Levofloxacin <=0.12 Sensitive      Oxacillin <=0.25 Sensitive      Trimethoprim/Sulfa <=10 Sensitive      Vancomycin 1 Sensitive       Impression:  Shahid Franks is a 75 year old male with    Right middle finger tenosynovitis  This is in the setting of trigger finger surgery  Status post or debridement on 6/19/2025  Cultures with MSSA  Currently on ceftriaxone and vancomycin    Recommendations:    Discontinue ceftriaxone and vancomycin  Start cefazolin 2 g IV every 8  Patient can be discharged on cefadroxil 1 g twice daily for the next 3 weeks  Follow-up with infectious disease in 1 to 2 weeks  Okay to discharge from ID perspective once okay with other services  Will sign off, please call us with any questions or changes status.  Thank you for this consultation.    The plan of care was discussed with the primary hospital team, Sameer Torres MD     Recommendations were also discussed with the patient; all questions were answered.     Thank you for this consultation. Please don't hesitate to call the ID team for questions or any acute changes in patient's clinical condition.    Please note that this report has been produced using speech recognition software and may contain errors related to that system including, but not limited to, errors in grammar, punctuation, and spelling, as well as words and phrases that possibly may have been recognized inappropriately.  If there are any questions or concerns, contact the dictating  provider for clarification.    The  Cures Act makes medical notes like these available to patients in the interest of transparency. Please be advised this is a medical document. Medical documents are intended to carry relevant information, facts as evident, and the clinical opinion of the practitioner. The medical note is intended as peer to peer communication and may appear blunt or direct. It is written in medical language and may contain abbreviations or verbiage that are unfamiliar.     Shawn Banks MD  [facility]. Tel: [phone]. Fax: [phone].     Shahid Franks : 1950 MRN: [id number] CSN: [id number]          [1]   ceFAZolin    lactated ringers    sennosides    docusate sodium    polyethylene glycol (PEG 3350)    magnesium hydroxide    bisacodyl    fleet enema    ondansetron    metoclopramide    morphINE **OR** morphINE **OR** morphINE    HYDROcodone-acetaminophen **OR** HYDROcodone-acetaminophen  [2]   Allergies  Allergen Reactions    Statins MYALGIA

## 2025-06-21 NOTE — PROGRESS NOTES
[facility] Hospitalist Progress Note     Subjective:  No acute events. Cultures reviewed with patient.     Review of Systems  Comprehensive ROS reviewed and negative except for what is stated in HPI.      OBJECTIVE:  BP (!) 161/91 (BP Location: Left arm)   Pulse 64   Temp 98.2 °F (36.8 °C) (Oral)   Resp 18   Ht 6' 2\" (1.88 m)   Wt 209 lb 7.2 oz (95 kg)   SpO2 91%   BMI 26.89 kg/m²   General: No apparent distress.  Alert and oriented.  HEENT:  EOMI, PERRLA.  Pulm: Clear breath sounds bilaterally.  Normal respiratory effort.  CV: Regular rate and rhythm, no murmur.   Abd: Soft, nontender, nondistended.   MSK: Right hand wrapped, no apparent drainage.   Skin: No lesions or rashes.  Neuro: No obvious focal deficits.      Data Review:    LABS:   Lab Results   Component Value Date    WBC 7.7 06/21/2025    HGB 14.1 06/21/2025    HCT 41.2 06/21/2025    .0 06/21/2025       All lab work and imaging personally reviewed.    Assessment/Plan:     Assessment/ Plan:     #Right middle finger tenosynovitis s/p incision/ drainage  -continue current pain regimen  -cultures reviewed, deescalated to IV ancef per ID. Possible DC today.     DVT ppx: SCDs  Diet: advance as tolerated  Disposition: depending clinical course     D/w patient and family at bedside.      Outpatient records or previous hospital records reviewed.   [facility] hospitalist to continue to follow patient while in house.     DO [facility] Hospitalist

## 2025-06-21 NOTE — PLAN OF CARE
POD 2 Rt middle finger I&D, Pt is AAOX4, 2 weeks post-op from trigger finger release, Pt is AAOX4, VSS, room air, up Ad krishna, PO meds for pain, see MAR, IV ABX will discharge today on PO ABX, will CTM.

## (undated) DEVICE — UPPER EXTREMITY CDS-LF: Brand: MEDLINE INDUSTRIES, INC.

## (undated) DEVICE — DISPOSABLE TOURNIQUET CUFF SINGLE BLADDER, DUAL PORT AND QUICK CONNECT CONNECTOR: Brand: COLOR CUFF

## (undated) DEVICE — BANDAGE ACE COMP 2INX5YDS UNSTERILE

## (undated) DEVICE — PADDING CAST 4INX4YD 100% COT SFT SLF BOND

## (undated) DEVICE — GLOVE SUR 8 SENSICARE PI PIP CRM PWD F

## (undated) DEVICE — STANDARD HYPODERMIC NEEDLE,POLYPROPYLENE HUB: Brand: MONOJECT

## (undated) DEVICE — DISPOSABLE BIPOLAR FORCEPS 4" (10.2CM) JEWELERS, STRAIGHT 0.4MM TIP AND 12 FT. (3.6M) CABLE: Brand: KIRWAN

## (undated) DEVICE — GLOVE SUR 7.5 SENSICARE PI PIP CRM PWD F

## (undated) DEVICE — BANDAGE COMPR 2INX5YD TAN E HYPOALRG SLF COBAN

## (undated) DEVICE — GOWN,SIRUS,FABRNF,XL,20/CS: Brand: MEDLINE

## (undated) DEVICE — SUT ETHLN 4-0 18IN NABSRB BLK 19MM PS

## (undated) DEVICE — SLEEVE COMPR MD KNEE LEN SGL USE KENDALL SCD

## (undated) DEVICE — SOLUTION IRRIG 1000ML 0.9% NACL USP BTL

## (undated) NOTE — LETTER
CLARIFICATION FOR E-SSS    To: Dr. Sameer Torres      Patient Name: Shahid Franks-Age / Sex: 1950-A: 75 y  male   Medical Records: [id number] [facility]: [id number]      Procedure Description:  RIGHT MIDDLE FINGER INCISION AND DRAINAGE    Please note that clarification is needed on the Electronic-Surgery Scheduling Sheet (E-SSS)  the original is included with this letter. Please have physician review and make changes on the faxed copy of the E-SSS.    Per patient, he is to be admitted to the hospital and states that this procedure is NOT supposed to be an outpatient procedure. Please verify, update patient and fax updated SSS. Thank you so much!  Please review and submit change if needed    ALL CHANGES MUST BE DOCUMENTED ON THE E-SSS AND SIGNED BY THE PHYSICIAN    After physician has made the changes and signed the E-SSS please fax it to [phone] and these changes will then be made in Epic by the OR schedulers.     If you have any questions please call Pre-Admission Testing at [phone]    Thank you